# Patient Record
Sex: FEMALE | Race: WHITE | NOT HISPANIC OR LATINO | Employment: PART TIME | URBAN - METROPOLITAN AREA
[De-identification: names, ages, dates, MRNs, and addresses within clinical notes are randomized per-mention and may not be internally consistent; named-entity substitution may affect disease eponyms.]

---

## 2018-04-14 ENCOUNTER — HOSPITAL ENCOUNTER (EMERGENCY)
Facility: HOSPITAL | Age: 23
Discharge: HOME | End: 2018-04-14
Attending: EMERGENCY MEDICINE | Admitting: EMERGENCY MEDICINE
Payer: COMMERCIAL

## 2018-04-14 ENCOUNTER — APPOINTMENT (EMERGENCY)
Dept: RADIOLOGY | Facility: HOSPITAL | Age: 23
End: 2018-04-14
Payer: COMMERCIAL

## 2018-04-14 VITALS
OXYGEN SATURATION: 100 % | HEART RATE: 73 BPM | DIASTOLIC BLOOD PRESSURE: 74 MMHG | SYSTOLIC BLOOD PRESSURE: 115 MMHG | TEMPERATURE: 98 F | WEIGHT: 130 LBS | RESPIRATION RATE: 16 BRPM

## 2018-04-14 DIAGNOSIS — S43.015A ANTERIOR SHOULDER DISLOCATION, LEFT, INITIAL ENCOUNTER: Primary | ICD-10-CM

## 2018-04-14 PROCEDURE — 99283 EMERGENCY DEPT VISIT LOW MDM: CPT

## 2018-04-14 PROCEDURE — 96374 THER/PROPH/DIAG INJ IV PUSH: CPT

## 2018-04-14 PROCEDURE — 96376 TX/PRO/DX INJ SAME DRUG ADON: CPT

## 2018-04-14 PROCEDURE — 73030 X-RAY EXAM OF SHOULDER: CPT

## 2018-04-14 PROCEDURE — 96375 TX/PRO/DX INJ NEW DRUG ADDON: CPT

## 2018-04-14 PROCEDURE — 73020 X-RAY EXAM OF SHOULDER: CPT

## 2018-04-14 RX ORDER — NAPROXEN 500 MG/1
500 TABLET ORAL 2 TIMES DAILY WITH MEALS
Qty: 30 TABLET | Refills: 0 | Status: SHIPPED | OUTPATIENT
Start: 2018-04-14

## 2018-04-14 RX ORDER — FENTANYL CITRATE 50 UG/ML
50 INJECTION, SOLUTION INTRAMUSCULAR; INTRAVENOUS ONCE
Status: COMPLETED | OUTPATIENT
Start: 2018-04-14 | End: 2018-04-14

## 2018-04-14 RX ORDER — DIAZEPAM 5 MG/ML
10 INJECTION, SOLUTION INTRAMUSCULAR; INTRAVENOUS ONCE
Status: COMPLETED | OUTPATIENT
Start: 2018-04-14 | End: 2018-04-14

## 2018-04-14 RX ADMIN — FENTANYL CITRATE 50 MCG: 50 INJECTION, SOLUTION INTRAMUSCULAR; INTRAVENOUS at 14:10

## 2018-04-14 RX ADMIN — FENTANYL CITRATE 50 MCG: 50 INJECTION, SOLUTION INTRAMUSCULAR; INTRAVENOUS at 12:43

## 2018-04-14 RX ADMIN — DIAZEPAM 5 MG: 5 INJECTION, SOLUTION INTRAMUSCULAR; INTRAVENOUS at 12:43

## 2018-04-14 NOTE — ED ATTENDING ATTESTATION
Joshua Munoz MD, saw and evaluated the patient  I have discussed the patient with the resident/non-physician practitioner and agree with the resident's/non-physician practitioner's findings, Plan of Care, and MDM as documented in the resident's/non-physician practitioner's note, except where noted  All available labs and Radiology studies were reviewed  At this point I agree with the current assessment done in the Emergency Department  I have conducted an independent evaluation of this patient a history and physical is as follows:  Left shoulder dislocation while playing ultimate [employer]  Neurovascular intact  After a dose of fentanyl for pain control we again attempted the patient performing an auto reduction technique where she did it herself with some traction which was successful  Neurovascular intact after was and she felt better and repeat x-ray ordered      Critical Care Time  CritCare Time    Procedures

## 2018-04-14 NOTE — ED NOTES
Shoulder dislocation reduced by Dr Fili Moore at this time        Irma Merrill 89, RN  04/14/18 0678

## 2018-04-14 NOTE — DISCHARGE INSTRUCTIONS
Shoulder Dislocation   WHAT YOU NEED TO KNOW:   A shoulder dislocation occurs when the top of your arm bone (humerus) moves out of the socket in your shoulder blade  DISCHARGE INSTRUCTIONS:   Medicines:  · Pain medicine: You may be given a prescription medicine to decrease pain  Do not wait until the pain is severe before you take this medicine  · Muscle relaxer: This helps the tight muscles in your shoulder relax  · Take your medicine as directed  Contact your healthcare provider if you think your medicine is not helping or if you have side effects  Tell him of her if you are allergic to any medicine  Keep a list of the medicines, vitamins, and herbs you take  Include the amounts, and when and why you take them  Bring the list or the pill bottles to follow-up visits  Carry your medicine list with you in case of an emergency  Follow up with your healthcare provider or bone specialist in 5 to 10 days:  Write down your questions so you remember to ask them during your visits  Activity:  You may need to rest your injured shoulder and arm, and avoid activities that cause you pain  Rest your shoulder and arm to help your muscles and tissues heal  Your healthcare provider may have you limit your shoulder movement for up to 6 weeks  After 6 weeks, you may be told to slowly increase your activities  It may take 4 to 6 months for your shoulder to heal completely  You may need to wait at least 6 months before you return to your usual activities  Rest your shoulder as directed  How to wear a brace, sling, or splint:  A brace, sling, or splint may be needed to limit your movement and protect your injured shoulder  · Wear your brace, sling, or splint all the time  Take it off only to bathe or do exercises as directed  Ask your healthcare provider or bone specialist how many weeks you should wear it  · Keep your skin clean and dry   Place padding under your armpit to help absorb sweat and prevent sores on your skin  · Do not hunch your shoulders  This may cause pain  Keep your shoulders relaxed  · Support your wrist and hand with the sling  Cover the knuckles on your hand with your sling  Your wrist should be positioned higher than your elbow  Your wrist may start to hurt or go numb if your sling is too short  Ice:  Ice helps decrease swelling and pain  Ice may also help prevent tissue damage  Use an ice pack, or put crushed ice in a plastic bag  Cover it with a towel and place it on your shoulder for 15 to 20 minutes every hour or as directed  Exercises:  Begin gentle exercises as directed  After healing begins, you may start light exercises so your shoulder does not get stiff  Physical therapy also may be ordered for you  A physical therapist teaches you exercises to help improve movement and strength, and to decrease pain  Do not lift heavy objects or do any exercise that causes severe pain  Contact your healthcare provider or bone specialist if:   · You have a fever  · You have increased pain in your injured shoulder and arm even after you rest and take your medicine  · You have new weakness or numbness in your injured shoulder and arm  · You have questions or concerns about your condition or care  Return to the emergency department if:   · Your injured shoulder and arm become pale or cold  · You cannot move your injured shoulder and arm  · You have increased redness or swelling in your injured shoulder  · Your shoulder becomes dislocated again  © 2017 [employer] Information is for End User's use only and may not be sold, redistributed or otherwise used for commercial purposes  All illustrations and images included in CareNotes® are the copyrighted property of [employer]  or Guillermo Bonilla  The above information is an  only  It is not intended as medical advice for individual conditions or treatments   Talk to your doctor, nurse or pharmacist before following any medical regimen to see if it is safe and effective for you  DISCHARGE INSTRUCTIONS:    FOLLOW UP WITH YOUR PRIMARY CARE PROVIDER OR THE [address] [place] [address]  MAKE AN APPOINTMENT TO BE SEEN  TAKE NAPROXEN AS PRESCRIBED FOR PAIN AND INFLAMMATION  IF RASH, SHORTNESS OF BREATH OR TROUBLE SWALLOWING, STOP TAKING THE MEDICATION AND BE SEEN  FOLLOW UP WITH THE RECOMMENDED ORTHOPEDIC SPECIALIST  MAKE AN APPOINTMENT TO BE SEEN  REST AND ELEVATE THE AREA  WEAR THE SLING UNTIL SEEN BY ORTHOPEDICS  IF SYMPTOMS WORSEN OR NEW SYMPTOMS ARISE, RETURN TO THE ER TO BE SEEN

## 2018-04-14 NOTE — ED NOTES
Sling fitted and applied to L arm at this time  Patient tolerated well  No complaints        Irma Merrill 89, RN  04/14/18 3729

## 2018-04-14 NOTE — ED NOTES
Patient reports took 600mg ibuprofen prior to arrival      Irma Merrill 89, [facility]  04/14/18 0623

## 2018-04-14 NOTE — ED PROVIDER NOTES
History  Chief Complaint   Patient presents with    Shoulder Injury     Pt playing frisbee when she fell and landed on shoulder  Pt states "it's not in the right place" Pt denies hitting head, LOC       23y  o female with no significant PMH presents to the ER for left shoulder pain since around 11:15 today  Patient was playing frisbee when she fell onto her left shoulder  She took Motrin for pain  She describes her pain as sharp/uncomfortable and non-radiating  She rates her pain 8/10 and pain is constant  She denies hitting her head or LOC  She denies previous shoulder dislocations  She denies fever, chills, chest pain, dyspnea, N/V/D, abdominal pain, weakness or paresthesias  History provided by:  Patient   used: No        None       History reviewed  No pertinent past medical history  Past Surgical History:   Procedure Laterality Date    GROWTH PLATE SURGERY         History reviewed  No pertinent family history  I have reviewed and agree with the history as documented  Social History   Substance Use Topics    Smoking status: Never Smoker    Smokeless tobacco: Never Used    Alcohol use Yes        Review of Systems   Constitutional: Negative for chills and fever  HENT: Negative for facial swelling  Eyes: Negative for redness  Respiratory: Negative for shortness of breath  Cardiovascular: Negative for chest pain  Gastrointestinal: Negative for abdominal pain, diarrhea, nausea and vomiting  Musculoskeletal: Negative for joint swelling and neck stiffness  Skin: Negative for rash  Allergic/Immunologic: Negative for food allergies  Neurological: Negative for weakness and numbness         Physical Exam  ED Triage Vitals   Temperature Pulse Respirations Blood Pressure SpO2   04/14/18 1140 04/14/18 1140 04/14/18 1140 04/14/18 1140 04/14/18 1140   98 °F (36 7 °C) 88 20 145/87 100 %      Temp Source Heart Rate Source Patient Position - Orthostatic VS BP Location FiO2 (%)   04/14/18 1140 04/14/18 1140 04/14/18 1140 04/14/18 1140 --   Temporal Monitor Lying Right arm       Pain Score       04/14/18 1243       Worst Possible Pain           Orthostatic Vital Signs  Vitals:    04/14/18 1140 04/14/18 1331 04/14/18 1539   BP: 145/87 117/81 115/74   Pulse: 88 69 73   Patient Position - Orthostatic VS: Lying Lying Lying       Physical Exam   Constitutional:  Non-toxic appearance  No distress  HENT:   Head: Normocephalic and atraumatic  Right Ear: Tympanic membrane, external ear and ear canal normal  No drainage, swelling or tenderness  No foreign bodies  Tympanic membrane is not erythematous  No hemotympanum  Left Ear: Tympanic membrane, external ear and ear canal normal  No drainage, swelling or tenderness  No foreign bodies  Tympanic membrane is not erythematous  No hemotympanum  Nose: Nose normal    Mouth/Throat: Uvula is midline, oropharynx is clear and moist and mucous membranes are normal  No uvula swelling  No posterior oropharyngeal edema, posterior oropharyngeal erythema or tonsillar abscesses  No tonsillar exudate  Eyes: Conjunctivae and EOM are normal  Pupils are equal, round, and reactive to light  Neck: Normal range of motion and phonation normal  Neck supple  No tracheal deviation present  Cardiovascular: Normal rate, regular rhythm, S1 normal, S2 normal and normal heart sounds  Exam reveals no gallop and no friction rub  No murmur heard  Pulmonary/Chest: Effort normal and breath sounds normal  No respiratory distress  She has no decreased breath sounds  She has no wheezes  She has no rhonchi  She has no rales  She exhibits no tenderness  Musculoskeletal: She exhibits no edema  Left shoulder: She exhibits decreased range of motion, tenderness, bony tenderness, deformity and pain  She exhibits no swelling, no effusion, no crepitus, normal pulse and normal strength          Left elbow: Normal         Cervical back: Normal         Thoracic back: Normal         Lumbar back: Normal         Left upper arm: Normal         Arms:  Neurological: She is alert  She has normal strength  No cranial nerve deficit or sensory deficit  She exhibits normal muscle tone  Gait normal  GCS eye subscore is 4  GCS verbal subscore is 5  GCS motor subscore is 6  Skin: Skin is warm and dry  No rash noted  Psychiatric: She has a normal mood and affect  Nursing note and vitals reviewed  ED Medications  Medications   fentanyl citrate (PF) 100 MCG/2ML 50 mcg (50 mcg Intravenous Given 4/14/18 1243)   diazepam (VALIUM) injection 10 mg (5 mg Intravenous Given 4/14/18 1243)   fentanyl citrate (PF) 100 MCG/2ML 50 mcg (50 mcg Intravenous Given 4/14/18 1410)       Diagnostic Studies  Results Reviewed     None                 XR shoulder 2+ vw left   ED Interpretation by Claudia Adams PA-C (04/14 1445)   Reduction successful  No acute fracture seen by me at this time  Final Result by Valarie Luz MD (04/14 5847)   Addendum 1 of 1 by Valarie Luz MD (04/14 8270)   ADDENDUM:      An additional scapular Y postreduction radiograph was submitted, also    demonstrating successful reduction of previously noted anteromedial    glenohumeral dislocation  Final      There has been successful reduction of previously noted anteromedial glenohumeral dislocation  No radiographic evidence of fracture  Workstation performed: [id number]         XR shoulder 1 vw left   ED Interpretation by Claudia Adams PA-C (04/14 3385)   Anterior shoulder dislocation seen by me at this time  No acute fracture seen by me at this time  Final Result by Veda Charles MD (04/14 5052)      Anterior shoulder dislocation  No fracture seen on this single view  Findings concur with the preliminary report by the referring clinician already in PACS and/or our electronic record EPIC              Workstation performed: [id number]                    Procedures  Orthopedic Injury  Date/Time: 4/14/2018 2:29 PM  Performed by: Rivka Arce by: Lus Gaucher   Consent: Verbal consent obtained  Consent given by: patient  Patient understanding: patient states understanding of the procedure being performed  Imaging studies: imaging studies available  Patient identity confirmed: arm band  Injury location: shoulder  Location details: left shoulder  Injury type: dislocation  Dislocation type: anterior  Hill-Sachs deformity: no  Chronicity: new  Pre-procedure neurovascular assessment: neurovascularly intact  Pre-procedure distal perfusion: normal  Pre-procedure neurological function: normal  Pre-procedure range of motion: reduced    Anesthesia:  Local anesthesia used: no  General Anesthesia used: noManipulation performed: yes  Reduction method: scapular manipulation  Skeletal traction used: no  Reduction successful: yes  X-ray confirmed reduction: yes  Immobilization: sling  Post-procedure neurovascular assessment: post-procedure neurovascularly intact  Post-procedure distal perfusion: normal  Post-procedure neurological function: normal  Post-procedure range of motion: improved  Patient tolerance: Patient tolerated the procedure well with no immediate complications             Phone Contacts  ED Phone Contact    ED Course  ED Course                                MDM  Number of Diagnoses or Management Options  Anterior shoulder dislocation, left, initial encounter: new and requires workup  Diagnosis management comments: DDX consists of but not limited to: fracture, contusion, dislocation, sprain    Will xray to r/o dislocation vs fracture  Xray shows dislocation  Will attempt to reduce by giving Fentanyl and Valium  Patient agreeable  Reduction successful  Will place in sling      At discharge, I instructed the patient to:  -follow up with pcp  -take Naproxen as prescribed for pain and inflammation  -rest and apply ice to the area  -follow up with the recommended orthopedic specialist  -return to the ER if symptoms worsened or new symptoms arose  Patient agreed to this plan and was stable at time of discharge  Amount and/or Complexity of Data Reviewed  Tests in the radiology section of CPT®: ordered and reviewed  Obtain history from someone other than the patient: yes (Spoke with patients "team mom")  Discuss the patient with other providers: yes (Spoke with Dr Clint Paredes)  Independent visualization of images, tracings, or specimens: yes    Patient Progress  Patient progress: stable    CritCare Time    Disposition  Final diagnoses:   Anterior shoulder dislocation, left, initial encounter     Time reflects when diagnosis was documented in both MDM as applicable and the Disposition within this note     Time User Action Codes Description Comment    4/14/2018  3:17 PM Dustin Parikh [[id number]] Anterior shoulder dislocation, left, initial encounter       ED Disposition     ED Disposition Condition Comment    Discharge  Taryn Steen discharge to home/self care  Condition at discharge: Stable        Follow-up Information     Follow up With Specialties Details Why [address] [facility] Schedule an appointment as soon as possible for a visit in 1 day  [address] [address]  [phone]        Discharge Medication List as of 4/14/2018  3:32 PM      START taking these medications    Details   naproxen (NAPROSYN) 500 mg tablet Take 1 tablet (500 mg total) by mouth 2 (two) times a day with meals, Starting Sat 4/14/2018, Print           No discharge procedures on file      ED Provider  Electronically Signed by           Ricardo Valle PA-C  04/14/18 6206